# Patient Record
Sex: MALE | URBAN - METROPOLITAN AREA
[De-identification: names, ages, dates, MRNs, and addresses within clinical notes are randomized per-mention and may not be internally consistent; named-entity substitution may affect disease eponyms.]

---

## 2017-04-10 ENCOUNTER — OFFICE VISIT (OUTPATIENT)
Dept: OPHTHALMOLOGY | Facility: CLINIC | Age: 52
End: 2017-04-10

## 2017-04-10 DIAGNOSIS — H10.812 PINGUECULITIS, LEFT EYE: ICD-10-CM

## 2017-04-10 DIAGNOSIS — T26.62XA: Primary | ICD-10-CM

## 2017-04-10 DIAGNOSIS — T54.3X1A: Primary | ICD-10-CM

## 2017-04-10 RX ORDER — NEOMYCIN SULFATE, POLYMYXIN B SULFATE AND DEXAMETHASONE 3.5; 10000; 1 MG/ML; [USP'U]/ML; MG/ML
1 SUSPENSION/ DROPS OPHTHALMIC 4 TIMES DAILY
Qty: 1 BOTTLE | Refills: 1 | Status: SHIPPED | OUTPATIENT
Start: 2017-04-10 | End: 2017-04-20

## 2017-04-10 ASSESSMENT — REFRACTION_WEARINGRX
OD_ADD: +2.00
OS_AXIS: 142
OS_ADD: +2.00
OS_SPHERE: +0.75
OD_AXIS: 163
OD_CYLINDER: +0.50
OS_CYLINDER: +0.75
OD_SPHERE: +0.50

## 2017-04-10 ASSESSMENT — CONF VISUAL FIELD
OS_NORMAL: 1
OD_NORMAL: 1

## 2017-04-10 ASSESSMENT — TONOMETRY
OS_IOP_MMHG: 10
IOP_METHOD: ICARE
OD_IOP_MMHG: 11

## 2017-04-10 ASSESSMENT — VISUAL ACUITY
METHOD: SNELLEN - LINEAR
OS_CC+: -3
OD_CC: 20/25
OD_CC+: -2
CORRECTION_TYPE: GLASSES
OS_CC: 20/40

## 2017-04-10 ASSESSMENT — SLIT LAMP EXAM - LIDS
COMMENTS: BLEPHARITIS
COMMENTS: BLEPHARITIS

## 2017-04-10 ASSESSMENT — EXTERNAL EXAM - RIGHT EYE: OD_EXAM: NORMAL

## 2017-04-10 ASSESSMENT — EXTERNAL EXAM - LEFT EYE: OS_EXAM: NORMAL

## 2017-04-10 ASSESSMENT — CUP TO DISC RATIO
OS_RATIO: 0.2
OD_RATIO: 0.2
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
## 2017-04-10 NOTE — NURSING NOTE
No chief complaint on file.    HPI    Affected eye(s):  Left   Symptoms:     Blurred vision   No floaters   No flashes   Redness   Tearing   Photophobia   Eye discharge      Duration:  5 days   Frequency:  Constant       Do you have eye pain now?:  Yes   Location:  OS   Pain Level:  Moderate Pain (5)   Other:  FBS   Pain Frequency:  Constant      Comments:  Pt complains of FBS LE. Onset 5 days. LE red, tearing and some matter. Painful to blink. Pt notes that something flew into eye last week at work, did flush out the eye and still very bothersome. Notes that he works at [employer] and also he does have to wear safety glasses at work. Would rate the pain today a 5. Vision is blurry LE. CLINTON STEVENS, COA 1:23 PM 04/10/2017

## 2017-04-13 ENCOUNTER — OFFICE VISIT (OUTPATIENT)
Dept: OPHTHALMOLOGY | Facility: CLINIC | Age: 52
End: 2017-04-13

## 2017-04-13 DIAGNOSIS — H02.889 MEIBOMIAN GLAND DYSFUNCTION: ICD-10-CM

## 2017-04-13 DIAGNOSIS — T26.62XD ALKALINE CHEMICAL BURN OF CORNEA AND CONJUNCTIVAL SAC, LEFT, SUBSEQUENT ENCOUNTER: Primary | ICD-10-CM

## 2017-04-13 DIAGNOSIS — T54.3X1D ALKALINE CHEMICAL BURN OF CORNEA AND CONJUNCTIVAL SAC, LEFT, SUBSEQUENT ENCOUNTER: Primary | ICD-10-CM

## 2017-04-13 ASSESSMENT — VISUAL ACUITY
OS_CC+: -1
OD_CC: 20/20
CORRECTION_TYPE: GLASSES
METHOD: SNELLEN - LINEAR
OS_CC: 20/30

## 2017-04-13 ASSESSMENT — TONOMETRY
OS_IOP_MMHG: 10
IOP_METHOD: ICARE
OD_IOP_MMHG: 12

## 2017-04-13 ASSESSMENT — CONF VISUAL FIELD
OS_NORMAL: 1
OD_NORMAL: 1

## 2017-04-13 ASSESSMENT — CUP TO DISC RATIO
OS_RATIO: 0.2
OD_RATIO: 0.2

## 2017-04-13 ASSESSMENT — EXTERNAL EXAM - RIGHT EYE: OD_EXAM: NORMAL

## 2017-04-13 ASSESSMENT — EXTERNAL EXAM - LEFT EYE: OS_EXAM: NORMAL

## 2017-04-13 ASSESSMENT — SLIT LAMP EXAM - LIDS: COMMENTS: BLEPHARITIS
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
## 2017-04-20 ENCOUNTER — OFFICE VISIT (OUTPATIENT)
Dept: OPHTHALMOLOGY | Facility: CLINIC | Age: 52
End: 2017-04-20

## 2017-04-20 DIAGNOSIS — H10.13 ALLERGIC CONJUNCTIVITIS, BILATERAL: ICD-10-CM

## 2017-04-20 DIAGNOSIS — T54.3X1D ALKALINE CHEMICAL BURN OF CORNEA AND CONJUNCTIVAL SAC, LEFT, SUBSEQUENT ENCOUNTER: Primary | ICD-10-CM

## 2017-04-20 DIAGNOSIS — H02.889 MEIBOMIAN GLAND DYSFUNCTION: ICD-10-CM

## 2017-04-20 DIAGNOSIS — T26.62XD ALKALINE CHEMICAL BURN OF CORNEA AND CONJUNCTIVAL SAC, LEFT, SUBSEQUENT ENCOUNTER: Primary | ICD-10-CM

## 2017-04-20 RX ORDER — OLOPATADINE HYDROCHLORIDE 1 MG/ML
1 SOLUTION/ DROPS OPHTHALMIC 2 TIMES DAILY
Qty: 1 BOTTLE | Refills: 3 | Status: SHIPPED | OUTPATIENT
Start: 2017-04-20

## 2017-04-20 RX ORDER — OLOPATADINE HYDROCHLORIDE 1 MG/ML
1 SOLUTION/ DROPS OPHTHALMIC 2 TIMES DAILY
Qty: 1 BOTTLE | Refills: 3 | Status: SHIPPED | OUTPATIENT
Start: 2017-04-20 | End: 2017-04-20

## 2017-04-20 ASSESSMENT — TONOMETRY
OD_IOP_MMHG: 12
IOP_METHOD: ICARE
OS_IOP_MMHG: 11

## 2017-04-20 ASSESSMENT — CONF VISUAL FIELD
OS_NORMAL: 1
OD_NORMAL: 1

## 2017-04-20 ASSESSMENT — VISUAL ACUITY
OD_CC+: -2
OS_CC: 20/30
CORRECTION_TYPE: GLASSES
METHOD: SNELLEN - LINEAR
OD_CC: 20/30

## 2017-04-20 ASSESSMENT — EXTERNAL EXAM - LEFT EYE: OS_EXAM: NORMAL

## 2017-04-20 ASSESSMENT — EXTERNAL EXAM - RIGHT EYE: OD_EXAM: NORMAL
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
## 2017-05-22 ENCOUNTER — OFFICE VISIT (OUTPATIENT)
Dept: OPHTHALMOLOGY | Facility: CLINIC | Age: 52
End: 2017-05-22

## 2017-05-22 DIAGNOSIS — H52.03 HYPEROPIA, BILATERAL: ICD-10-CM

## 2017-05-22 DIAGNOSIS — H02.889 MEIBOMIAN GLAND DYSFUNCTION: Primary | ICD-10-CM

## 2017-05-22 DIAGNOSIS — H10.13 ALLERGIC CONJUNCTIVITIS, BILATERAL: ICD-10-CM

## 2017-05-22 ASSESSMENT — VISUAL ACUITY
OS_CC: 20/25
METHOD: SNELLEN - LINEAR
OD_CC: 20/25
OD_CC+: -2
OS_CC+: -2

## 2017-05-22 ASSESSMENT — REFRACTION_MANIFEST
OS_ADD: +2.00
OD_CYLINDER: +0.75
OS_CYLINDER: +0.75
OS_SPHERE: +0.50
OD_SPHERE: +1.00
OD_ADD: +2.00
OD_AXIS: 017
OS_AXIS: 162

## 2017-05-22 ASSESSMENT — CUP TO DISC RATIO
OD_RATIO: 0.2
OS_RATIO: 0.2

## 2017-05-22 ASSESSMENT — EXTERNAL EXAM - RIGHT EYE: OD_EXAM: NORMAL

## 2017-05-22 ASSESSMENT — CONF VISUAL FIELD
OS_NORMAL: 1
OD_NORMAL: 1

## 2017-05-22 ASSESSMENT — TONOMETRY
OS_IOP_MMHG: 13
IOP_METHOD: ICARE
OD_IOP_MMHG: 12

## 2017-05-22 ASSESSMENT — REFRACTION_WEARINGRX
OD_SPHERE: +0.50
OS_SPHERE: +0.75
OS_CYLINDER: +0.75
OS_AXIS: 142
OD_CYLINDER: +0.50
OS_ADD: +2.00
OD_ADD: +2.00
OD_AXIS: 163

## 2017-05-22 ASSESSMENT — EXTERNAL EXAM - LEFT EYE: OS_EXAM: NORMAL

## 2017-05-22 NOTE — PROGRESS NOTES
Assessment/Plan  (H02.89) Meibomian gland dysfunction  (primary encounter diagnosis)  Comment: Central lid thickening still present inferiorly  Plan:  Educated patient on clinical findings. Referred to Dr. Abdul for consultation, given no improvement in appearance of lid thickening. May be secondary to longstanding meibomian gland dysfunction, or due to chemical injury.    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 Dispensed spectacle prescription for full time wear. Educated patient on possibility of adaptation period, if symptoms do not improve return to clinic for further testing. Monitor annually.    (H10.13) Allergic conjunctivitis, bilateral  Plan:  Recommended continued use of olopatadine bid OU. Monitor annually.      Complete documentation of historical and exam elements from today's encounter can  be found in the full encounter summary report (not reduplicated in this progress  note). I personally obtained the chief complaint(s) and history of present illness. I  confirmed and edited as necessary the review of systems, past medical/surgical  history, family history, social history, and examination findings as documented by  others; and I examined the patient myself. I personally reviewed the relevant tests,  images, and reports as documented above. I formulated and edited as necessary the  assessment and plan and discussed the findings and management plan with the  patient and family.    Charles Mcdowell OD, FAAO

## 2017-05-30 ENCOUNTER — OFFICE VISIT (OUTPATIENT)
Dept: OPHTHALMOLOGY | Facility: CLINIC | Age: 52
End: 2017-05-30

## 2017-05-30 DIAGNOSIS — H02.889 MGD (MEIBOMIAN GLAND DYSFUNCTION): ICD-10-CM

## 2017-05-30 DIAGNOSIS — H02.9 EYELID LESION: Primary | ICD-10-CM

## 2017-05-30 ASSESSMENT — CONF VISUAL FIELD
OS_NORMAL: 1
OD_NORMAL: 1

## 2017-05-30 ASSESSMENT — TONOMETRY
OD_IOP_MMHG: 12
IOP_METHOD: ICARE
OS_IOP_MMHG: 10

## 2017-05-30 ASSESSMENT — VISUAL ACUITY
OS_CC+: +2
METHOD: SNELLEN - LINEAR
OS_CC: 20/20
CORRECTION_TYPE: GLASSES
OD_CC: 20/15

## 2017-05-30 ASSESSMENT — EXTERNAL EXAM - RIGHT EYE: OD_EXAM: NORMAL

## 2017-05-30 ASSESSMENT — EXTERNAL EXAM - LEFT EYE: OS_EXAM: NORMAL

## 2017-05-30 ASSESSMENT — CUP TO DISC RATIO
OS_RATIO: 0.2
OD_RATIO: 0.2

## 2017-05-30 NOTE — MR AVS SNAPSHOT
After Visit Summary   5/30/2017    Aric Santana    MRN: [id number]           Patient Information     Date Of Birth          1965        Visit Information        Provider Department      5/30/2017 1:45 PM Ashley Abdul MD; [employer] [facility]        Today's Diagnoses     Eyelid lesion    -  1    MGD (meibomian gland dysfunction)           Follow-ups after your visit        Your next 10 appointments already scheduled     Jun 23, 2017  1:30 PM CDT   (Arrive by 1:15 PM)   PAC EVALUATION with  Pac Urban 2   [facility] Pre[facility] [place] ([facility])    [address] [place] [id number] [phone]            Jun 23, 2017  2:30 PM CDT   (Arrive by 2:15 PM)   PAC RN ASSESSMENT with  Pac Rn   [facility] [place] ([facility])    [address] [place] [id number] [phone]            Jun 23, 2017  3:00 PM CDT   (Arrive by 2:45 PM)   PAC Anesthesia Consult with  Pac Anesthesiologist   [facility] [place] ([facility])    [address] [place] [address] [id number] [phone]            Jul 11, 2017  1:45 PM CDT   (Arrive by 1:30 PM)   Post-Op with Ashley Abdul MD   [facility] ([facility])    [address] [place] [address] [id number] [phone]              Who to contact     Please call your clinic at [phone] to:    Ask questions about your health    Make or cancel appointments    Discuss your medicines    Learn about your test results    Speak to your doctor   If you have compliments or concerns about an experience at your clinic, or if you wish to file a complaint, please contact [facility] Physicians Patient Relations at [phone] or email us at  [email or web address]         Additional Information About Your Visit        [employer] Information     [employer] is an electronic gateway that provides easy, online access to your medical records. With [employer], you can request a clinic appointment, read your test results, renew a prescription or communicate with your care team.     To sign up for [employer] visit the website at [email or web address]   You will be asked to enter the access code listed below, as well as some personal information. Please follow the directions to create your username and password.     Your access code is: [email or web address]  Expires: 2017  2:05 PM     Your access code will  in 90 days. If you need help or a new code, please contact your [facility] or call [phone] for assistance.        Care EveryWhere ID     This is your Care EveryWhere ID. This could be used by other organizations to access your [place] medical records  [id number]         Blood Pressure from Last 3 Encounters:   No data found for BP    Weight from Last 3 Encounters:   No data found for Wt              We Performed the Following     External Photos OU (both eyes)     Ashly-Operative Worksheet (Plastics)        Primary Care Provider    None Specified       No primary provider on file.        Thank you!     Thank you for choosing [facility]  for your care. Our goal is always to provide you with excellent care. Hearing back from our patients is one way we can continue to improve our services. Please take a few minutes to complete the written survey that you may receive in the mail after your visit with us. Thank you!             Your Updated Medication List - Protect others around you: Learn how to safely use, store and throw away your medicines at www.disposemymeds.org.          This list is accurate as of: 17  3:29 PM.  Always use your most recent med list.                   Brand Name Dispense  Instructions for use    olopatadine 0.1 % ophthalmic solution    PATANOL    1 Bottle    Place 1 drop into both eyes 2 times daily

## 2017-05-30 NOTE — NURSING NOTE
Chief Complaints and History of Present Illnesses   Patient presents with     Follow Up For     lid lesion left eye     HPI    Affected eye(s):  Both   Symptoms:     No decreased vision   Tearing (Comment: OU)      Frequency:  Constant       Do you have eye pain now?:  No      Comments:  The lid lesion seems to change in size, bigger in AM. Tearing both eyes. Patient states that it is not painful now, but it was before. He states that it feel like something is in there and is more annoying then painful. No changes to vision. Using olopatadine BID and up to TID OU.    No Bazzi COT 2:19 PM May 30, 2017

## 2017-05-30 NOTE — PROGRESS NOTES
"Chief Complaints and History of Present Illnesses   Patient presents with     Follow Up For     lid lesion left eye     Aric Santana is a 51 year old M who presents as a referral from Dr. Mcdowell for eval of LLL thickening. Pt reports presence of thickening x1 mo, but Dr. Mcdowell has noted this for at least the past 2mo. Attributes it to getting something into his eye/eylid. Using warm compresses 2x daily. Noted small amount of white drainage from lesion. Pt denies hx of eyelid injury but describes getting \"something like sand\" into the eye at work when he was using what sounds like an air compressor to blow the sand off, and the problem started around that time.          Assessment & Plan     Aric Santana is a 51 year old male with the following diagnoses:   1. Eyelid lesion    2. MGD (meibomian gland dysfunction)       LLL thickening with prominent telangiectasias, very localized.   Continue warm compresses 2x/day.   If no improvement in next several weeks plan left lower eyelid pentagonal wedge.       Complete documentation of historical and exam elements from today's encounter can be found in the full encounter summary report (not reduplicated in this progress note). I personally obtained the chief complaint(s) and history of present illness.  I confirmed and edited as necessary the review of systems, past medical/surgical history, family history, social history, and examination findings as documented by others; and I examined the patient myself. I personally reviewed the relevant tests, images, and reports as documented above. I formulated and edited as necessary the assessment and plan and discussed the findings and management plan with the patient and family. - Ashley Abdul MD    Today with Aric Santana  and his , I reviewed the indications, risks, benefits, and alternatives of the proposed surgical procedure including, but not limited to, failure obtain the desired result  and need for " additional surgery, bleeding, infection, loss of vision, loss of the eye, and the remote possibility of permanent damage to any organ system or death with the use of anesthesia.  I provided multiple opportunities for the questions, answered all questions to the best of my ability, and confirmed that my answers and my discussion were understood.   Ashley Abdul

## 2017-05-30 NOTE — LETTER
2017         RE:  :  MRN: Aric [id number]  1965  [id number]     Dear Dr. Charles Mcdowell,    Thank you for asking me to see your patient, Aric Santana, for an oculoplastic   consultation.  My assessment and plan are below.  For further details, please see my attached clinic note.          Assessment & Plan     Aric Santana is a 51 year old male with the following diagnoses:   1. Eyelid lesion    2. MGD (meibomian gland dysfunction)       LLL thickening with prominent telangiectasias, very localized.   Continue warm compresses 2x/day.   If no improvement in next several weeks plan left lower eyelid pentagonal wedge.        Again, thank you for allowing me to participate in the care of your patient.      Sincerely,    Ashley Abdul MD  Department of Ophthalmology and Visual Neurosciences  [facility]    CC: Charles Mcdowell, OD  [facility]  [address] [facility] [id number]  VIA In Basket

## 2018-05-15 ENCOUNTER — RECORDS - HEALTHEAST (OUTPATIENT)
Dept: LAB | Facility: CLINIC | Age: 53
End: 2018-05-15

## 2018-05-18 LAB
QTF INTERPRETATION: NORMAL
QTF MITOGEN - NIL: 8.2 IU/ML
QTF NIL: 0.03 IU/ML
QTF RESULT: NEGATIVE
QTF TB ANTIGEN - NIL: 0 IU/ML